# Patient Record
Sex: MALE | Race: OTHER | NOT HISPANIC OR LATINO | URBAN - METROPOLITAN AREA
[De-identification: names, ages, dates, MRNs, and addresses within clinical notes are randomized per-mention and may not be internally consistent; named-entity substitution may affect disease eponyms.]

---

## 2018-10-17 ENCOUNTER — EMERGENCY (EMERGENCY)
Facility: HOSPITAL | Age: 33
LOS: 1 days | End: 2018-10-17
Attending: EMERGENCY MEDICINE
Payer: COMMERCIAL

## 2018-10-17 VITALS
RESPIRATION RATE: 21 BRPM | HEIGHT: 71.5 IN | SYSTOLIC BLOOD PRESSURE: 146 MMHG | WEIGHT: 203.05 LBS | HEART RATE: 101 BPM | DIASTOLIC BLOOD PRESSURE: 89 MMHG | OXYGEN SATURATION: 99 % | TEMPERATURE: 98 F

## 2018-10-17 PROCEDURE — 99283 EMERGENCY DEPT VISIT LOW MDM: CPT

## 2018-10-17 RX ORDER — ALPRAZOLAM 0.25 MG
1 TABLET ORAL
Qty: 9 | Refills: 0 | OUTPATIENT
Start: 2018-10-17 | End: 2018-10-19

## 2018-10-17 NOTE — ED ADULT NURSE NOTE - NSIMPLEMENTINTERV_GEN_ALL_ED
Implemented All Universal Safety Interventions:  [place] to call system. Call bell, personal items and telephone within reach. Instruct patient to call for assistance. Room bathroom lighting operational. Non-slip footwear when patient is off stretcher. Physically safe environment: no spills, clutter or unnecessary equipment. Stretcher in lowest position, wheels locked, appropriate side rails in place.

## 2018-10-17 NOTE — ED PROVIDER NOTE - OBJECTIVE STATEMENT
33 yo male no pmhx c/o 1 week of feeling panic attacks, feeling sense of self doom, no SI/HI, having difficulty sleeping last night.  No other complaints.  States when he talks to get his mind off of feeling anxious, he feels fine.  Currently asymptomatic.

## 2018-10-17 NOTE — ED ADULT NURSE NOTE - OBJECTIVE STATEMENT
This is a 31 y/o male received ambulatory AXO&4 c/o Insomnia and anxiety x 2 week now. Fear of doom for self and family . Fear that will die, and having panic attack. Denies any suicidal and homicidal ideation, denies any auditory and visual hallucination. educated on using coping skills to cope with feelings will monitor support and safety maintained.

## 2018-10-17 NOTE — ED PROVIDER NOTE - PHYSICAL EXAMINATION
Gen: Alert, NAD  Head/eyes: NC/AT, PERRL, EOMI, normal lids/conjunctiva, no scleral icterus  ENT: Bilateral TM WNL, normal hearing, patent oropharynx without erythema/exudate, uvula midline, no peritonsillar abscess, no tongue/uvula swelling  Neck: supple, no tenderness/meningismus/JVD, Trachea midline  Pulm: Bilateral clear BS, normal resp effort, no wheeze/stridor/retractions  CV: RRR, no M/R/G, +2 dist pulses (radial, pedal DP/PT, popliteal)  Abd: soft, NT/ND, +BS, no guarding/rebound tenderness  Musculoskeletal: no edema/erythema/cyanosis, FROM in all extremities, no C/T/L spine ttp  Skin: no rash, no vesicles, no petechaie, no ecchymosis, no swelling  Neuro: AAOx3, CN 2-12 intact, normal sensation, 5/5 motor strength in all extremities, normal gait, no dysmetria

## 2018-10-17 NOTE — ED PROVIDER NOTE - MEDICAL DECISION MAKING DETAILS
anxiety/panic disorder, currently asymptomatic, no HI/SI, will d/c on PO anti-anxiety meds, f/u with outpatient psych or pmd

## 2019-08-07 ENCOUNTER — TRANSCRIPTION ENCOUNTER (OUTPATIENT)
Age: 34
End: 2019-08-07

## 2020-02-28 PROBLEM — Z00.00 ENCOUNTER FOR PREVENTIVE HEALTH EXAMINATION: Status: ACTIVE | Noted: 2020-02-28

## 2020-03-02 ENCOUNTER — LABORATORY RESULT (OUTPATIENT)
Age: 35
End: 2020-03-02

## 2020-03-02 ENCOUNTER — APPOINTMENT (OUTPATIENT)
Dept: UROLOGY | Facility: CLINIC | Age: 35
End: 2020-03-02
Payer: COMMERCIAL

## 2020-03-02 VITALS
BODY MASS INDEX: 29.26 KG/M2 | OXYGEN SATURATION: 98 % | SYSTOLIC BLOOD PRESSURE: 165 MMHG | HEIGHT: 71 IN | WEIGHT: 209 LBS | HEART RATE: 104 BPM | DIASTOLIC BLOOD PRESSURE: 111 MMHG

## 2020-03-02 DIAGNOSIS — N13.8 BENIGN PROSTATIC HYPERPLASIA WITH LOWER URINARY TRACT SYMPMS: ICD-10-CM

## 2020-03-02 DIAGNOSIS — Z86.59 PERSONAL HISTORY OF OTHER MENTAL AND BEHAVIORAL DISORDERS: ICD-10-CM

## 2020-03-02 DIAGNOSIS — Z78.9 OTHER SPECIFIED HEALTH STATUS: ICD-10-CM

## 2020-03-02 DIAGNOSIS — N40.1 BENIGN PROSTATIC HYPERPLASIA WITH LOWER URINARY TRACT SYMPMS: ICD-10-CM

## 2020-03-02 DIAGNOSIS — R39.9 UNSPECIFIED SYMPTOMS AND SIGNS INVOLVING THE GENITOURINARY SYSTEM: ICD-10-CM

## 2020-03-02 DIAGNOSIS — F17.200 NICOTINE DEPENDENCE, UNSPECIFIED, UNCOMPLICATED: ICD-10-CM

## 2020-03-02 PROCEDURE — 51798 US URINE CAPACITY MEASURE: CPT | Mod: 59

## 2020-03-02 PROCEDURE — 99204 OFFICE O/P NEW MOD 45 MIN: CPT | Mod: 25

## 2020-03-02 PROCEDURE — 51741 ELECTRO-UROFLOWMETRY FIRST: CPT

## 2020-03-02 NOTE — HISTORY OF PRESENT ILLNESS
[FreeTextEntry1] : 35 yo male presents for lower urinary tract symptoms. \par For last 6 months has noticed lower urinary tract symptoms. \par Reports slower stream, urinates every 3 hours or so during the day. No nocturia. \par Endorses hesitancy, straining(improved flow), sense of incomplete emptying and post void dribbling. \par Denies dysuria, hematuria, lower abdominal or flank pain, fever, chills or rigors.\par No history of STD, trauma to penis or procedure on penis. \par Normal erections and libido. \par No family history of Prostate cancer. \par \par Also complaining of left groin pain, radiating to back of thigh and low back pain. \par \par

## 2020-03-02 NOTE — PHYSICAL EXAM
[Normal Appearance] : normal appearance [General Appearance - Well Developed] : well developed [FreeTextEntry1] : normal peripheral circulation  [General Appearance - In No Acute Distress] : no acute distress [] : no respiratory distress [Abdomen Tenderness] : non-tender [Abdomen Soft] : soft [Abdomen Mass (___ Cm)] : no abdominal mass palpated [Urethral Meatus] : meatus normal [Costovertebral Angle Tenderness] : no ~M costovertebral angle tenderness [Scrotum] : the scrotum was normal [Epididymis] : the epididymides were normal [Testes Tenderness] : no tenderness of the testes [Penis Abnormality] : normal uncircumcised penis [Prostate Tenderness] : the prostate was not tender [No Prostate Nodules] : no prostate nodules [Testes Mass (___cm)] : there were no testicular masses [Prostate Size ___ gm] : prostate size [unfilled] gm [Normal Station and Gait] : the gait and station were normal for the patient's age [No Focal Deficits] : no focal deficits [Skin Color & Pigmentation] : normal skin color and pigmentation [No Palpable Adenopathy] : no palpable adenopathy [Oriented To Time, Place, And Person] : oriented to person, place, and time

## 2020-03-02 NOTE — ASSESSMENT
[FreeTextEntry1] : Lower urinary tract symptoms:\par Benign Prostatic Hyperplasia:\par Discussed that he has LUTs which probably are secondary to enlarged prostate and that I would like to address those first with alpha blockers. Patient agreeable. \par Prescribed Alfuzosin. Explained common side effects: nasal congestion, cough, muscle pain, back pain, dizziness, orthostatic hypotension, somnolence, retrograde ejaculation, decreased libido and erectile dysfunction. \par \par Asked to see Neurology for groin pain and back pain. \par \par Return to office in 4 weeks at [facility] for possible Cystoscopy if persistent symptoms- or sooner if any issues- will do Uroflo and PVR. \par

## 2020-03-02 NOTE — REVIEW OF SYSTEMS
[Abdominal Pain] : abdominal pain [Feeling Tired] : feeling tired [both] : pain during and after intercourse [denies] : denies pain with orgasm [base] : pain in base of penis [Strain or push to urinate] : strain or push to urinate [Slow urine stream] : slow urine stream [Wait a long time to urinate] : waits a long time to urinate [Feelings Of Weakness] : feelings of weakness [Anxiety] : anxiety [Negative] : Heme/Lymph

## 2020-03-04 LAB
APPEARANCE: CLEAR
BILIRUBIN URINE: NEGATIVE
BLOOD URINE: NEGATIVE
COLOR: NORMAL
GLUCOSE QUALITATIVE U: NEGATIVE
KETONES URINE: NEGATIVE
LEUKOCYTE ESTERASE URINE: NEGATIVE
NITRITE URINE: NEGATIVE
PH URINE: 6
PROTEIN URINE: ABNORMAL
SPECIFIC GRAVITY URINE: >=1.03
UROBILINOGEN URINE: NORMAL

## 2020-05-26 ENCOUNTER — RX CHANGE (OUTPATIENT)
Age: 35
End: 2020-05-26

## 2020-08-25 ENCOUNTER — RX RENEWAL (OUTPATIENT)
Age: 35
End: 2020-08-25

## 2020-08-27 ENCOUNTER — RX RENEWAL (OUTPATIENT)
Age: 35
End: 2020-08-27

## 2020-08-27 RX ORDER — ALFUZOSIN HYDROCHLORIDE 10 MG/1
10 TABLET, EXTENDED RELEASE ORAL DAILY
Qty: 30 | Refills: 5 | Status: ACTIVE | COMMUNITY
Start: 2020-03-02 | End: 1900-01-01

## 2020-09-23 ENCOUNTER — RX RENEWAL (OUTPATIENT)
Age: 35
End: 2020-09-23

## 2020-09-23 RX ORDER — ALFUZOSIN HYDROCHLORIDE 10 MG/1
10 TABLET, EXTENDED RELEASE ORAL
Qty: 30 | Refills: 0 | Status: ACTIVE | COMMUNITY
Start: 2020-08-27 | End: 1900-01-01

## 2020-10-19 ENCOUNTER — RX RENEWAL (OUTPATIENT)
Age: 35
End: 2020-10-19